# Patient Record
Sex: MALE | Race: WHITE | NOT HISPANIC OR LATINO | Employment: FULL TIME | URBAN - METROPOLITAN AREA
[De-identification: names, ages, dates, MRNs, and addresses within clinical notes are randomized per-mention and may not be internally consistent; named-entity substitution may affect disease eponyms.]

---

## 2017-01-26 ENCOUNTER — OFFICE VISIT (OUTPATIENT)
Dept: URGENT CARE | Facility: PHYSICIAN GROUP | Age: 52
End: 2017-01-26
Payer: COMMERCIAL

## 2017-01-26 ENCOUNTER — HOSPITAL ENCOUNTER (OUTPATIENT)
Dept: RADIOLOGY | Facility: MEDICAL CENTER | Age: 52
End: 2017-01-26
Attending: FAMILY MEDICINE
Payer: COMMERCIAL

## 2017-01-26 VITALS
WEIGHT: 163 LBS | HEART RATE: 92 BPM | HEIGHT: 68 IN | OXYGEN SATURATION: 98 % | DIASTOLIC BLOOD PRESSURE: 86 MMHG | TEMPERATURE: 98.4 F | RESPIRATION RATE: 16 BRPM | BODY MASS INDEX: 24.71 KG/M2 | SYSTOLIC BLOOD PRESSURE: 124 MMHG
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
## 2017-01-26 DIAGNOSIS — S62.319A FRACTURE OF METACARPAL BASE OF RIGHT HAND, CLOSED, INITIAL ENCOUNTER: ICD-10-CM

## 2017-01-26 DIAGNOSIS — S69.91XA INJURY OF RIGHT HAND, INITIAL ENCOUNTER: ICD-10-CM

## 2017-01-26 PROCEDURE — 99203 OFFICE O/P NEW LOW 30 MIN: CPT | Performed by: FAMILY MEDICINE

## 2017-01-26 PROCEDURE — 73130 X-RAY EXAM OF HAND: CPT | Mod: RT

## 2017-01-26 RX ORDER — SIMVASTATIN 20 MG
20 TABLET ORAL NIGHTLY
COMMUNITY

## 2017-01-26 NOTE — MR AVS SNAPSHOT
"        Major Courtneyjazzy   2017 9:45 AM   Office Visit   MRN: [id number]    Department:  [facility]   Dept Phone:  [phone]    Description:  Male : 1965   Provider:  Luis Baltazar M.D.           Reason for Visit     Hand Injury and knee R side fell last night      Allergies as of 2017     Not on File      You were diagnosed with     Fall, initial encounter   [812521]       Injury of right hand, initial encounter   [[id number]]       Fracture of metacarpal base of right hand, closed, initial encounter   [[id number]]         Vital Signs     Blood Pressure Pulse Temperature Respirations Height Weight    124/86 mmHg 92 36.9 °C (98.4 °F) 16 1.727 m (5' 8\") 73.936 kg (163 lb)    Body Mass Index Oxygen Saturation Smoking Status             24.79 kg/m2 98% Never Smoker          Basic Information     Date Of Birth Sex Race Ethnicity Preferred Language    1965 Male White Non- English      Health Maintenance     Patient has no pending health maintenance at this time      Current Immunizations     No immunizations on file.      Below and/or attached are the medications your provider expects you to take. Review all of your home medications and newly ordered medications with your provider and/or pharmacist. Follow medication instructions as directed by your provider and/or pharmacist. Please keep your medication list with you and share with your provider. Update the information when medications are discontinued, doses are changed, or new medications (including over-the-counter products) are added; and carry medication information at all times in the event of emergency situations     Allergies:  No Known Allergies          Medications  Valid as of: 2017 - 10:53 AM    Generic Name Brand Name Tablet Size Instructions for use    Mesalamine   Take  by mouth.        Simvastatin (Tab) ZOCOR 20 MG Take 20 mg by mouth every evening.        .                 Medicines prescribed today were sent " to:     [facility] [place] [place] - [address]    [address] [place] [address]    Phone: [phone] Fax: [phone]    Open 24 Hours?: No      Medication refill instructions:       If your prescription bottle indicates you have medication refills left, it is not necessary to call your provider’s office. Please contact your pharmacy and they will refill your medication.    If your prescription bottle indicates you do not have any refills left, you may request refills at any time through one of the following ways: The online Lynk system (except Urgent Care), by calling your provider’s office, or by asking your pharmacy to contact your provider’s office with a refill request. Medication refills are processed only during regular business hours and may not be available until the next business day. Your provider may request additional information or to have a follow-up visit with you prior to refilling your medication.   *Please Note: Medication refills are assigned a new Rx number when refilled electronically. Your pharmacy may indicate that no refills were authorized even though a new prescription for the same medication is available at the pharmacy. Please request the medicine by name with the pharmacy before contacting your provider for a refill.        Your To Do List     Future Labs/Procedures Complete By Expires    DX-HAND 3+ RIGHT  As directed 1/26/2018      Referral     A referral request has been sent to our patient care coordination department. Please allow 3-5 business days for us to process this request and contact you either by phone or mail. If you do not hear from us by the 5th business day, please call us at [phone].           Lynk Access Code: [email or web address]  Expires: 2/25/2017  9:40 AM    Lynk  A secure, online tool to manage your health information     [employer] Lynk® is a secure, online tool that connects you to your personalized health information from  the privacy of your home -- day or night - making it very easy for you to manage your healthcare. Once the activation process is completed, you can even access your medical information using the [employer] william, which is available for free in the Apple William store or Google Play store.     [employer] provides the following levels of access (as shown below):   My Chart Features   [facility] Doctor [facility]  Specialists [facility]  Urgent  Care Non-[facility]  Primary Care  Doctor   Email your healthcare team securely and privately 24/7 X X X    Manage appointments: schedule your next appointment; view details of past/upcoming appointments X      Request prescription refills. X      View recent personal medical records, including lab and immunizations X X X X   View health record, including health history, allergies, medications X X X X   Read reports about your outpatient visits, procedures, consult and ER notes X X X X   See your discharge summary, which is a recap of your hospital and/or ER visit that includes your diagnosis, lab results, and care plan. X X       How to register for [employer]:  1. Go to  [email or web address].  2. Click on the Sign Up Now box, which takes you to the New Member Sign Up page. You will need to provide the following information:  a. Enter your [employer] Access Code exactly as it appears at the top of this page. (You will not need to use this code after you’ve completed the sign-up process. If you do not sign up before the expiration date, you must request a new code.)   b. Enter your date of birth.   c. Enter your home email address.   d. Click Submit, and follow the next screen’s instructions.  3. Create a [employer] ID. This will be your [employer] login ID and cannot be changed, so think of one that is secure and easy to remember.  4. Create a [employer] password. You can change your password at any time.  5. Enter your Password Reset Question and Answer. This can be used at a later time if you  forget your password.   6. Enter your e-mail address. This allows you to receive e-mail notifications when new information is available in [employer].  7. Click Sign Up. You can now view your health information.    For assistance activating your [employer] account, call [phone]

## 2017-01-26 NOTE — PROGRESS NOTES
"Subjective:      Major Bower is a 51 y.o. male who presents with Hand Injury            Hand Injury  This is a new problem. The current episode started yesterday (mechanical GLF to right hand and right knee. Moderate pain. Swelling of right hand, pain worse with flexion/ext of fingers. Abrasion right knee has been irrigated and dressed. No OTC meds. ).   No other aggravating or alleviating factors.      Review of Systems   HENT:        No head trauma.     Musculoskeletal:        No hip, ankle, or wrist pain   Skin:        No other abrasion or laceration     .  Medications, Allergies, and current problem list reviewed today in Epic         Objective:     /86 mmHg  Pulse 92  Temp(Src) 36.9 °C (98.4 °F)  Resp 16  Ht 1.727 m (5' 8\")  Wt 73.936 kg (163 lb)  BMI 24.79 kg/m2  SpO2 98%     Physical Exam   Constitutional: He appears well-developed and well-nourished. No distress.   HENT:   Head: Normocephalic and atraumatic.   Eyes: Conjunctivae are normal.   Musculoskeletal:   Right hand: tender with marked STS ulnar aspect. Mild ecchymosis. No obvious deformity. Distal neuro/vascular intact. ROM intact.      Neurological: He exhibits abnormal muscle tone.   Speech is clear. Patient is appropriate and cooperative.     Skin: Skin is warm and dry.   Superficial prepatellar abrasion right knee. No active bleeding. No FB. Irrigated and dressed.                Assessment/Plan:   Xray hand: nondisplaced fracture base 5th MC by my read, rad pending    1. Fall, initial encounter      2. Injury of right hand, initial encounter    - DX-HAND 3+ RIGHT; Future    3. Fracture of metacarpal base of right hand, closed, initial encounter    - REFERRAL TO ORTHOPEDICS    Relative rest, ice, nsaid or tylenol prn. Elevation and compression prn swelling. Resume activity as tolerated.  Ulnar gutter splint placed.           "

## 2021-07-23 ENCOUNTER — OFFICE VISIT (OUTPATIENT)
Dept: URGENT CARE | Facility: CLINIC | Age: 56
End: 2021-07-23
Payer: COMMERCIAL

## 2021-07-23 ENCOUNTER — HOSPITAL ENCOUNTER (OUTPATIENT)
Dept: RADIOLOGY | Facility: HOSPITAL | Age: 56
Discharge: HOME | End: 2021-07-23
Payer: COMMERCIAL

## 2021-07-23 VITALS
RESPIRATION RATE: 18 BRPM | OXYGEN SATURATION: 97 % | DIASTOLIC BLOOD PRESSURE: 76 MMHG | TEMPERATURE: 98.3 F | SYSTOLIC BLOOD PRESSURE: 136 MMHG | WEIGHT: 170.4 LBS | HEART RATE: 72 BPM

## 2021-07-23 DIAGNOSIS — M79.675 GREAT TOE PAIN, LEFT: ICD-10-CM

## 2021-07-23 DIAGNOSIS — S92.422A CLOSED DISPLACED FRACTURE OF DISTAL PHALANX OF LEFT GREAT TOE, INITIAL ENCOUNTER: Primary | ICD-10-CM

## 2021-07-23 PROCEDURE — 99203 OFFICE O/P NEW LOW 30 MIN: CPT | Performed by: NURSE PRACTITIONER

## 2021-07-23 PROCEDURE — 73660 X-RAY EXAM OF TOE(S): CPT | Mod: LT

## 2021-07-23 RX ORDER — MESALAMINE 1.2 G/1
2.4 TABLET, DELAYED RELEASE ORAL DAILY
COMMUNITY
Start: 2021-06-10

## 2021-07-23 RX ORDER — SILDENAFIL 100 MG/1
100 TABLET, FILM COATED ORAL AS NEEDED
COMMUNITY
Start: 2021-05-21

## 2021-07-23 RX ORDER — TADALAFIL 5 MG/1
5 TABLET ORAL AS NEEDED
COMMUNITY
Start: 2021-05-13

## 2021-07-23 RX ORDER — SIMVASTATIN 40 MG/1
40 TABLET, FILM COATED ORAL NIGHTLY
COMMUNITY
Start: 2021-07-09

## 2021-07-23 NOTE — PROGRESS NOTES
Subjective      Luis Aguirre is a 56 y.o. male who presents for left great toe pain. He reports hitting the great toe on a door 5 days ago. This occurred at work. Pt reports he's had pain, worsened with toe movement since. He's been using some ice and Advil for relief. No numbness, tingling or nail damage.     HPI    The following have been reviewed and updated as appropriate in this visit:  Allergies  Meds  Problems  Med Hx  Surg Hx  Fam Hx         No Known Allergies  Current Outpatient Medications   Medication Sig Dispense Refill   • Lialda 1.2 gram EC tablet Take 2.4 g by mouth daily     • sildenafiL (VIAGRA) 100 mg tablet Take 100 mg by mouth as needed     • simvastatin (ZOCOR) 40 mg tablet Take 40 mg by mouth nightly     • tadalafiL (CIALIS) 5 mg tablet Take 5 mg by mouth as needed       No current facility-administered medications for this visit.     History reviewed. No pertinent past medical history.  History reviewed. No pertinent surgical history.  History reviewed. No pertinent family history.  Social History     Occupational History   • Not on file   Tobacco Use   • Smoking status: Not on file   Substance and Sexual Activity   • Alcohol use: Not on file   • Drug use: Not on file   • Sexual activity: Not on file   Social History Narrative   • Not on file       Review of Systems   As noted in HPI     Objective   /76 (BP Location: Right arm, Patient Position: Sitting, Cuff Size: Long Adult)   Pulse 72   Temp 36.8 °C (98.3 °F) (Temporal)   Resp 18   Wt 77.3 kg (170 lb 6.4 oz)   SpO2 97%     Physical Exam  Vitals and nursing note reviewed.   Constitutional:       General: He is not in acute distress.  Musculoskeletal:      Left foot: Normal capillary refill. Swelling, tenderness and bony tenderness present. Normal pulse.        Legs:       Comments: TTP proximal to the nail. There is noted ecchymosis and mild swelling. Pt has increased pain with flexion/extension. No open areas of skin.  Nail intact.    Neurological:      Mental Status: He is alert.         Assessment/Plan   Diagnoses and all orders for this visit:    Closed displaced fracture of distal phalanx of left great toe, initial encounter  -     DME    Great toe pain, left  -     X-ray toe 2 or more views left    Xray of the left great toe obtained. Impression notes subtle linear lucency seen through the distal tuft of the left first distal phalanx, likely representing minimally displaced fracture vs prominent nutrient foramen. Recommend correlation with point tenderness. Nailbed injury would be considered open fracture. Surrounding soft tissues unremarkable. As noted, pt's point tenderness is more proximal to the area of fracture. However, given the mechanism of injury will treat as such and have pt follow up with a podiatrist once he returns home as is here for work. Advised we could refer him to podiatrist or orthopedist here however pt prefers to see someone once he returns home. Buddy taping applied and pt placed in post op shoe. Continue with OTC analgesics and icing as needed. Protection of the injured area advised. Pt will follow up as instructed or sooner if needed. He verbalizes understanding and agreement with the above plan of care.     Silvia Bartholomew, CNP